# Patient Record
Sex: MALE | Race: WHITE
[De-identification: names, ages, dates, MRNs, and addresses within clinical notes are randomized per-mention and may not be internally consistent; named-entity substitution may affect disease eponyms.]

---

## 2018-10-11 ENCOUNTER — HOSPITAL ENCOUNTER (EMERGENCY)
Dept: HOSPITAL 53 - M ED | Age: 28
Discharge: HOME | End: 2018-10-11
Payer: COMMERCIAL

## 2018-10-11 DIAGNOSIS — I86.1: Primary | ICD-10-CM

## 2018-10-11 DIAGNOSIS — N50.3: ICD-10-CM

## 2018-10-11 LAB
CHLAMYDIA DNA AMPLIFICATION: NEGATIVE
GC DNA AMPLIFICATION: NEGATIVE
LEUKOCYTE ESTERASE UR AUTO RFX: NEGATIVE
SPECIFIC GRAVITY UR AUTO RFX: 1.02 (ref 1–1.03)
SQUAM EPITHELIAL CELL UR AURFX: 0 /HPF (ref 0–6)

## 2018-10-11 PROCEDURE — 76870 US EXAM SCROTUM: CPT

## 2019-11-08 ENCOUNTER — HOSPITAL ENCOUNTER (OUTPATIENT)
Dept: HOSPITAL 53 - M RAD | Age: 29
End: 2019-11-08
Attending: GENERAL PRACTICE
Payer: COMMERCIAL

## 2019-11-08 DIAGNOSIS — G54.0: Primary | ICD-10-CM

## 2019-11-08 PROCEDURE — 71275 CT ANGIOGRAPHY CHEST: CPT

## 2021-08-17 NOTE — REP
Thoracic outlet syndrome CT angiography:

The IV contrast bolus is split in two.

Injection is performed into the right upper extremity veins, as the more

symptomatic side is on the left.

Scanning is initially performed with the right arm over head and left arm at side

during the arterial phase of enhancement and repeated after 90 seconds during the

venous phase of enhancement.

Following this scanning is repeated with the left arm over head and right arm at

side during the arterial phase of enhancement and repeated during the venous

phase of enhancement.

 

Axial images are acquired and sagittal and coronal reformats are performed.

 

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

the left clavicle.

There is is no left subclavian vein impression compression with the left arm at

side.

There is no left subclavian artery compression with the arm over head or the arm

at side.

There is no compression of the right subclavian artery or right subclavian vein

with the arm at side or arm over head.  Images on the right are somewhat

compromised by beam-hardening artifact from the dense contrast in the right upper

extremity veins  during contrast infusion.

 

 

 

 

Electronically Signed by

Troy Mondragon MD 11/09/2019 12:18 P Detail Level: Simple Additional Notes: Patient consent was obtained to proceed with the visit and recommended plan of care after discussion of all risks and benefits, including the risks of COVID-19 exposure.

## 2024-08-27 ENCOUNTER — HOSPITAL ENCOUNTER (OUTPATIENT)
Dept: HOSPITAL 53 - M PLAIMG | Age: 34
End: 2024-08-27
Attending: INTERNAL MEDICINE

## 2024-08-27 DIAGNOSIS — R52: Primary | ICD-10-CM
